# Patient Record
Sex: MALE | Race: WHITE | NOT HISPANIC OR LATINO | URBAN - METROPOLITAN AREA
[De-identification: names, ages, dates, MRNs, and addresses within clinical notes are randomized per-mention and may not be internally consistent; named-entity substitution may affect disease eponyms.]

---

## 2021-01-18 RX ORDER — AZITHROMYCIN 500 MG/1
1 TABLET, FILM COATED ORAL
Qty: 0 | Refills: 0 | DISCHARGE
Start: 2021-01-18 | End: 2021-01-24

## 2021-01-21 ENCOUNTER — INPATIENT (INPATIENT)
Facility: HOSPITAL | Age: 21
LOS: 0 days | Discharge: HOME | DRG: 395 | End: 2021-01-22
Attending: INTERNAL MEDICINE | Admitting: INTERNAL MEDICINE
Payer: COMMERCIAL

## 2021-01-21 VITALS — WEIGHT: 169.98 LBS | HEIGHT: 71 IN

## 2021-01-21 DIAGNOSIS — T18.128A FOOD IN ESOPHAGUS CAUSING OTHER INJURY, INITIAL ENCOUNTER: ICD-10-CM

## 2021-01-21 RX ORDER — GLUCAGON INJECTION, SOLUTION 0.5 MG/.1ML
1 INJECTION, SOLUTION SUBCUTANEOUS ONCE
Refills: 0 | Status: COMPLETED | OUTPATIENT
Start: 2021-01-21 | End: 2021-01-21

## 2021-01-21 RX ORDER — LIDOCAINE 4 G/100G
10 CREAM TOPICAL ONCE
Refills: 0 | Status: COMPLETED | OUTPATIENT
Start: 2021-01-21 | End: 2021-01-21

## 2021-01-21 RX ORDER — SODIUM CHLORIDE 9 MG/ML
1000 INJECTION INTRAMUSCULAR; INTRAVENOUS; SUBCUTANEOUS ONCE
Refills: 0 | Status: COMPLETED | OUTPATIENT
Start: 2021-01-21 | End: 2021-01-21

## 2021-01-21 RX ORDER — PANTOPRAZOLE SODIUM 20 MG/1
40 TABLET, DELAYED RELEASE ORAL ONCE
Refills: 0 | Status: COMPLETED | OUTPATIENT
Start: 2021-01-21 | End: 2021-01-21

## 2021-01-21 NOTE — ED ADULT NURSE NOTE - OBJECTIVE STATEMENT
patient presents to ED with with c/o a piece of chicken sandwich stuck in throat approx 1hour ago. pt states that throat has been swollen since this AM. pt c/o chest tightness for 1week. pt went to primary on monday for chest tightness. prescribed inhaler, steroid and antibiotic. no respiratory distress present at this time. hx of asthma

## 2021-01-22 VITALS
SYSTOLIC BLOOD PRESSURE: 105 MMHG | DIASTOLIC BLOOD PRESSURE: 66 MMHG | RESPIRATION RATE: 18 BRPM | TEMPERATURE: 98 F | HEART RATE: 81 BPM | OXYGEN SATURATION: 98 %

## 2021-01-22 DIAGNOSIS — K22.2 ESOPHAGEAL OBSTRUCTION: ICD-10-CM

## 2021-01-22 LAB
ALBUMIN SERPL ELPH-MCNC: 4.6 G/DL — SIGNIFICANT CHANGE UP (ref 3.3–5)
ALP SERPL-CCNC: 81 U/L — SIGNIFICANT CHANGE UP (ref 40–120)
ALT FLD-CCNC: 30 U/L — SIGNIFICANT CHANGE UP (ref 12–78)
ANION GAP SERPL CALC-SCNC: 5 MMOL/L — SIGNIFICANT CHANGE UP (ref 5–17)
AST SERPL-CCNC: 11 U/L — LOW (ref 15–37)
BASOPHILS # BLD AUTO: 0.06 K/UL — SIGNIFICANT CHANGE UP (ref 0–0.2)
BASOPHILS NFR BLD AUTO: 0.8 % — SIGNIFICANT CHANGE UP (ref 0–2)
BILIRUB SERPL-MCNC: 0.5 MG/DL — SIGNIFICANT CHANGE UP (ref 0.2–1.2)
BUN SERPL-MCNC: 12 MG/DL — SIGNIFICANT CHANGE UP (ref 7–23)
CALCIUM SERPL-MCNC: 9.1 MG/DL — SIGNIFICANT CHANGE UP (ref 8.5–10.1)
CHLORIDE SERPL-SCNC: 106 MMOL/L — SIGNIFICANT CHANGE UP (ref 96–108)
CK SERPL-CCNC: 57 U/L — SIGNIFICANT CHANGE UP (ref 26–308)
CO2 SERPL-SCNC: 29 MMOL/L — SIGNIFICANT CHANGE UP (ref 22–31)
CREAT SERPL-MCNC: 0.76 MG/DL — SIGNIFICANT CHANGE UP (ref 0.5–1.3)
EOSINOPHIL # BLD AUTO: 0.04 K/UL — SIGNIFICANT CHANGE UP (ref 0–0.5)
EOSINOPHIL NFR BLD AUTO: 0.6 % — SIGNIFICANT CHANGE UP (ref 0–6)
GLUCOSE SERPL-MCNC: 93 MG/DL — SIGNIFICANT CHANGE UP (ref 70–99)
HCT VFR BLD CALC: 41.7 % — SIGNIFICANT CHANGE UP (ref 39–50)
HGB BLD-MCNC: 15 G/DL — SIGNIFICANT CHANGE UP (ref 13–17)
IMM GRANULOCYTES NFR BLD AUTO: 0.1 % — SIGNIFICANT CHANGE UP (ref 0–1.5)
LIDOCAIN IGE QN: 156 U/L — SIGNIFICANT CHANGE UP (ref 73–393)
LYMPHOCYTES # BLD AUTO: 2.69 K/UL — SIGNIFICANT CHANGE UP (ref 1–3.3)
LYMPHOCYTES # BLD AUTO: 37.6 % — SIGNIFICANT CHANGE UP (ref 13–44)
MCHC RBC-ENTMCNC: 31.1 PG — SIGNIFICANT CHANGE UP (ref 27–34)
MCHC RBC-ENTMCNC: 36 GM/DL — SIGNIFICANT CHANGE UP (ref 32–36)
MCV RBC AUTO: 86.3 FL — SIGNIFICANT CHANGE UP (ref 80–100)
MONOCYTES # BLD AUTO: 0.53 K/UL — SIGNIFICANT CHANGE UP (ref 0–0.9)
MONOCYTES NFR BLD AUTO: 7.4 % — SIGNIFICANT CHANGE UP (ref 2–14)
NEUTROPHILS # BLD AUTO: 3.82 K/UL — SIGNIFICANT CHANGE UP (ref 1.8–7.4)
NEUTROPHILS NFR BLD AUTO: 53.5 % — SIGNIFICANT CHANGE UP (ref 43–77)
PLATELET # BLD AUTO: 269 K/UL — SIGNIFICANT CHANGE UP (ref 150–400)
POTASSIUM SERPL-MCNC: 3.8 MMOL/L — SIGNIFICANT CHANGE UP (ref 3.5–5.3)
POTASSIUM SERPL-SCNC: 3.8 MMOL/L — SIGNIFICANT CHANGE UP (ref 3.5–5.3)
PROT SERPL-MCNC: 7.9 GM/DL — SIGNIFICANT CHANGE UP (ref 6–8.3)
RAPID RVP RESULT: SIGNIFICANT CHANGE UP
RBC # BLD: 4.83 M/UL — SIGNIFICANT CHANGE UP (ref 4.2–5.8)
RBC # FLD: 12 % — SIGNIFICANT CHANGE UP (ref 10.3–14.5)
SARS-COV-2 RNA SPEC QL NAA+PROBE: SIGNIFICANT CHANGE UP
SODIUM SERPL-SCNC: 140 MMOL/L — SIGNIFICANT CHANGE UP (ref 135–145)
TROPONIN I SERPL-MCNC: <0.015 NG/ML — SIGNIFICANT CHANGE UP (ref 0.01–0.04)
WBC # BLD: 7.15 K/UL — SIGNIFICANT CHANGE UP (ref 3.8–10.5)
WBC # FLD AUTO: 7.15 K/UL — SIGNIFICANT CHANGE UP (ref 3.8–10.5)

## 2021-01-22 PROCEDURE — 88305 TISSUE EXAM BY PATHOLOGIST: CPT

## 2021-01-22 PROCEDURE — 88305 TISSUE EXAM BY PATHOLOGIST: CPT | Mod: 26

## 2021-01-22 PROCEDURE — 71045 X-RAY EXAM CHEST 1 VIEW: CPT | Mod: 26

## 2021-01-22 RX ORDER — ALBUTEROL 90 UG/1
2 AEROSOL, METERED ORAL
Qty: 0 | Refills: 0 | DISCHARGE

## 2021-01-22 RX ORDER — SUCRALFATE 1 G
1 TABLET ORAL ONCE
Refills: 0 | Status: COMPLETED | OUTPATIENT
Start: 2021-01-22 | End: 2021-01-22

## 2021-01-22 RX ORDER — ONDANSETRON 8 MG/1
4 TABLET, FILM COATED ORAL ONCE
Refills: 0 | Status: COMPLETED | OUTPATIENT
Start: 2021-01-22 | End: 2021-01-22

## 2021-01-22 RX ORDER — DEXAMETHASONE 0.5 MG/5ML
10 ELIXIR ORAL ONCE
Refills: 0 | Status: COMPLETED | OUTPATIENT
Start: 2021-01-22 | End: 2021-01-22

## 2021-01-22 RX ADMIN — ONDANSETRON 4 MILLIGRAM(S): 8 TABLET, FILM COATED ORAL at 00:21

## 2021-01-22 RX ADMIN — Medication 1 GRAM(S): at 03:00

## 2021-01-22 RX ADMIN — LIDOCAINE 10 MILLILITER(S): 4 CREAM TOPICAL at 00:46

## 2021-01-22 RX ADMIN — GLUCAGON INJECTION, SOLUTION 1 MILLIGRAM(S): 0.5 INJECTION, SOLUTION SUBCUTANEOUS at 00:21

## 2021-01-22 RX ADMIN — SODIUM CHLORIDE 1000 MILLILITER(S): 9 INJECTION INTRAMUSCULAR; INTRAVENOUS; SUBCUTANEOUS at 01:59

## 2021-01-22 RX ADMIN — Medication 0.5 MILLIGRAM(S): at 03:34

## 2021-01-22 RX ADMIN — Medication 102 MILLIGRAM(S): at 03:34

## 2021-01-22 RX ADMIN — Medication 30 MILLILITER(S): at 00:46

## 2021-01-22 RX ADMIN — SODIUM CHLORIDE 1000 MILLILITER(S): 9 INJECTION INTRAMUSCULAR; INTRAVENOUS; SUBCUTANEOUS at 00:21

## 2021-01-22 RX ADMIN — PANTOPRAZOLE SODIUM 40 MILLIGRAM(S): 20 TABLET, DELAYED RELEASE ORAL at 00:21

## 2021-01-22 NOTE — CONSULT NOTE ADULT - SUBJECTIVE AND OBJECTIVE BOX
HPI: 20 yr old male who has had intermittent dysphagia and gerd/heartburn preceding this past evening when after swallowing chicken he felt it get stuck in throat   then had difficulty with swallowing his saliva   went to er  received glucagon  now has no difficulty swallowing his saliva but has had slight discomfort at throat   no dizziness or chest pain   no diarrhea or constipation  no prior egd  no known food allergy  no brbpr or melena       PAST MEDICAL & SURGICAL HISTORY:  No pertinent past medical history    No significant past surgical history        Allergies    No Known Allergies    Intolerances        MEDICATIONS  (STANDING):    MEDICATIONS  (PRN):      FAMILY HISTORY:    n/a  Social History: no tobacco and no etoh    REVIEW OF SYSTEMS      General:	No fever or chills    Skin/Breast: No jaundice or rash   		  ENMT: denies sore throat or thrush    Respiratory and Thorax: Denies dyspnea or cough or shortness of breath  	  Cardiovascular: Denies chest pain or palpitations 	    Gastrointestinal: Denies jaundice or pruritis    Genitourinary: Denies dysuria or hematuria	    Musculoskeletal: Denies muscular pain or swelling	    Neurological: Denies confusion or tremor	    Hematology/Lymphatics: Denies easy bruising or bleeding 	    Endocrine:	Denies polyphagia or polyuria    See above hx otherwise neg for any major organ systems    PHYSICAL EXAM:    Vital Signs Last 24 Hrs  T(C): 36.7 (22 Jan 2021 06:53), Max: 36.8 (21 Jan 2021 23:20)  T(F): 98.1 (22 Jan 2021 06:53), Max: 98.3 (21 Jan 2021 23:20)  HR: 81 (22 Jan 2021 06:53) (77 - 81)  BP: 105/66 (22 Jan 2021 06:53) (105/66 - 130/87)  BP(mean): 79 (22 Jan 2021 06:53) (79 - 95)  RR: 18 (22 Jan 2021 06:53) (18 - 18)  SpO2: 98% (22 Jan 2021 06:53) (98% - 99%)    Constitutional: no acute distress    ENMT: NC/AT scl anicteric opm pink no lesions     Neck: supple. No jvd or LN    Respiratory: Clear     Cardiovascular: RRR s1s2     Gastrointestinal: Pos bs , soft , not tender, no hepatosplenomegaly,  no mass        Back: No CVA tenderness    Extremities: NO cce     Neurological: Alert and oriented x 3     Skin: No rash or jaundice     Date/Time:01-22 @ 00:07    Albumin: 4.6  ALT/SGPT: 30  Alk Phos: 81  AST/SGOT: 11  Bilirubin Direct: --  Bilirubin Total: 0.5  Ca: 9.1  eGFR : 151  eGFR Non-: 130  Lipase: 156  Amylase: --  INR: --  PTT: --      01-22    140  |  106  |  12  ----------------------------<  93  3.8   |  29  |  0.76    Ca    9.1      22 Jan 2021 00:07    TPro  7.9  /  Alb  4.6  /  TBili  0.5  /  DBili  x   /  AST  11<L>  /  ALT  30  /  AlkPhos  81  01-22                            15.0   7.15  )-----------( 269      ( 22 Jan 2021 00:07 )             41.7   < from: Xray Chest 1 View-PORTABLE IMMEDIATE (Xray Chest 1 View-PORTABLE IMMEDIATE .) (01.22.21 @ 00:52) >    EXAM:  XR CHEST PORTABLE IMMED 1V                            PROCEDURE DATE:  01/22/2021          INTERPRETATION:  CLINICAL INDICATION: Chest pain.    TECHNIQUE: AP view of the chest.    COMPARISON: XR: None. CT: None.    FINDINGS: No focal opacity. No large pleural effusion or pneumothorax. No pulmonary vascular congestion. The cardiomediastinal silhouette is unremarkable. Grossly unremarkable bones.    IMPRESSION:    No obvious focal opacity. Follow-up PA and lateral views may be obtained as indicated.            HERLINDA LAWRENCE MD; Attending Radiologist  This document has been electronically signed. Jan 22 2021  6:25AM    < end of copied text >

## 2021-01-22 NOTE — ED PROVIDER NOTE - PROGRESS NOTE DETAILS
JG:  DIscussed labs and CXR with patient.  Patient states he thinks chicken moved down but still feeling something in throat.  PO challenge recommended and patient agreeable.  Patient ate crackers and drank ginger ale and tolerated it.  He did not have any nausea, vomiting, dry heaving.  Both solids and liquids passed without difficulty.  Will order carafate.  Will refer patient to GI and put him on PPI. JG:  Spoke to GI Dr. Aparicio on call because patient still has sensation of food stuck , now at sternal notch.  Dr. Aparicio wants patient kept NPO. RVP sent.

## 2021-01-22 NOTE — PROVIDER CONTACT NOTE (OTHER) - SITUATION
Both Dr. Ventura and Dr. Aparicio spoke about this consult earlier.
Left message to please return Dr. Ventura's call.

## 2021-01-22 NOTE — ED PROVIDER NOTE - ENMT, MLM
Airway patent without stridor, Nasal mucosa clear. Mouth with normal mucosa. Throat has no vesicles, no oropharyngeal exudates and uvula is midline.

## 2021-01-22 NOTE — ED ADULT NURSE REASSESSMENT NOTE - NS ED NURSE REASSESS COMMENT FT1
patient states that food feels like it is lower in throat but still present. no respiratory distress present.

## 2021-01-22 NOTE — CONSULT NOTE ADULT - ASSESSMENT
throat pain and esophageal food bolus -passed   advise egd with possible biopsies   rationale, all potential risks including but not limited to perforation requiring surgery, aspiration pneumonia or bleeding requiring blood transfusions were discussed along with all benefits and alternatives and the pt wishes to have an upper endoscopy   protonix 40 mg daily

## 2021-01-22 NOTE — ED PROVIDER NOTE - CONSTITUTIONAL, MLM
Well appearing, awake, alert, oriented to person, place, time/situation and in no apparent distress.  Able to speak full sentences and tolerate secretions. normal...

## 2021-01-22 NOTE — ED PROVIDER NOTE - NSFOLLOWUPINSTRUCTIONS_ED_ALL_ED_FT
Take Prevacid over the counter daily for 2 weeks.  See a gastroenterologist.                       FOOD IMPACTION - AfterCare(R) Instructions(ER/ED)           Food Impaction    WHAT YOU NEED TO KNOW:    Food impaction occurs when food (often meat or fish bones) becomes stuck in your esophagus. Food impaction can occur if your esophagus does not function normally. Food impaction may also happen if you do not have teeth or do not chew your food completely.     DISCHARGE INSTRUCTIONS:    Return to the emergency department if:   •You are unable to swallow your saliva.      •Your breathing is noisy.      •You have trouble breathing.      •You have repeated vomiting or blood in your vomit.      •You have pain in your chest or abdomen.      When should I contact my healthcare provider?   •You feel you have something stuck in your throat.      •You choke or vomit after eating.      •You have questions or concerns about your condition or care.       Follow up with your healthcare provider as directed: You may need tests to check your ability to swallow. Write down your questions so you remember to ask them during your visits.    Prevent food impaction:   •Sit up while you eat. Raise the head of your bed if you need to eat in bed.      •Make sure your dentures fit well. Poorly fitting dentures make it difficult to chew.       •Chew your food completely. Proper chewing will help with swallowing.       •Drink water with meals. Water will help move food down to your stomach.

## 2021-01-22 NOTE — ED ADULT NURSE REASSESSMENT NOTE - NS ED NURSE REASSESS COMMENT FT1
Report given to endoscopy RN at aprx [id number], plan is to send patient to endoscopy unit at 10 am. pt made aware of plan and is agreeable.

## 2021-01-22 NOTE — ED PROVIDER NOTE - OBJECTIVE STATEMENT
Pt. is a 19 yo M without any medical problems presenting with symptoms of chest tightness and discomfort X 4 days, followed by pain after swallowing about an hour ago with a feeling of his bite of chicken sandwich stuck in his throat.  Patient was diagnosed with bronchitis and put on inhaler and azithromycin without any relief. Patient denies history of trouble swallowing.  He states he took a bite of a chicken sandwich an hour ago and it would not pass and is stuck in his throat. He tried to drink soda and liquids which passed slowly but he still feels like food is stuck in his throat.  Denies coughing or choking.  Outpatient Covid swab still pending.

## 2021-01-22 NOTE — ED PROVIDER NOTE - CLINICAL SUMMARY MEDICAL DECISION MAKING FREE TEXT BOX
Esophageal food impaction in upper to mid esophagus; will give IV glucagon, zofran, protonix and re-evaluate.      Maalox and viscous lidocaine also ordered after IV meds.  Will get EKG, CXR, and labs for recent complaints of chest pain.  RVP sent including Covid test.
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
## 2021-01-28 DIAGNOSIS — T18.128A FOOD IN ESOPHAGUS CAUSING OTHER INJURY, INITIAL ENCOUNTER: ICD-10-CM

## 2021-01-28 DIAGNOSIS — R12 HEARTBURN: ICD-10-CM
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
## 2021-01-28 DIAGNOSIS — K44.9 DIAPHRAGMATIC HERNIA WITHOUT OBSTRUCTION OR GANGRENE: ICD-10-CM

## 2021-01-28 DIAGNOSIS — K21.9 GASTRO-ESOPHAGEAL REFLUX DISEASE WITHOUT ESOPHAGITIS: ICD-10-CM

## 2021-01-28 DIAGNOSIS — R13.10 DYSPHAGIA, UNSPECIFIED: ICD-10-CM

## 2021-01-28 DIAGNOSIS — R09.89 OTHER SPECIFIED SYMPTOMS AND SIGNS INVOLVING THE CIRCULATORY AND RESPIRATORY SYSTEMS: ICD-10-CM

## 2021-06-04 PROBLEM — Z78.9 OTHER SPECIFIED HEALTH STATUS: Chronic | Status: ACTIVE | Noted: 2021-01-22

## 2021-06-17 ENCOUNTER — OUTPATIENT (OUTPATIENT)
Dept: OUTPATIENT SERVICES | Facility: HOSPITAL | Age: 21
LOS: 1 days | End: 2021-06-17
Payer: COMMERCIAL

## 2021-06-17 DIAGNOSIS — R13.10 DYSPHAGIA, UNSPECIFIED: ICD-10-CM

## 2021-06-17 PROCEDURE — 74220 X-RAY XM ESOPHAGUS 1CNTRST: CPT

## 2021-06-17 PROCEDURE — 74220 X-RAY XM ESOPHAGUS 1CNTRST: CPT | Mod: 26

## 2021-06-18 DIAGNOSIS — R13.10 DYSPHAGIA, UNSPECIFIED: ICD-10-CM

## 2022-11-29 NOTE — ED ADULT NURSE NOTE - IS THE PATIENT ABLE TO BE SCREENED?
Cardiology
Endocrinology
Endocrinology
Anesthesia
Endocrinology
CT Surgery
Cardiology
Cardiology
Endocrinology
Hospitalist
Hospitalist
Intervent Cardiology
Endocrinology
CT Surgery
Cardiology
CT Surgery
Cardiology
CT Surgery
Yes

## 2024-03-05 NOTE — ED PROVIDER NOTE - CROS ED ENMT ALL NEG
Inpatient device interrogation and/or reprogramming orders received; the industry representative was contacted and provided with patient's name and room number.         
- - -